# Patient Record
Sex: FEMALE | Race: WHITE | NOT HISPANIC OR LATINO | Employment: UNEMPLOYED | URBAN - METROPOLITAN AREA
[De-identification: names, ages, dates, MRNs, and addresses within clinical notes are randomized per-mention and may not be internally consistent; named-entity substitution may affect disease eponyms.]

---

## 2024-08-23 ENCOUNTER — TELEPHONE (OUTPATIENT)
Age: 39
End: 2024-08-23

## 2024-08-23 ENCOUNTER — TELEPHONE (OUTPATIENT)
Dept: GASTROENTEROLOGY | Facility: CLINIC | Age: 39
End: 2024-08-23

## 2024-08-23 DIAGNOSIS — K70.31 ALCOHOLIC CIRRHOSIS OF LIVER WITH ASCITES (HCC): Primary | ICD-10-CM

## 2024-08-23 NOTE — TELEPHONE ENCOUNTER
Patient to be discharged from [facility]. Needs office visit with PHYSICIAN ONLY in 1-2 weeks and with hepatology within 1-2 mos.    Order for outpatient paracentesis placed, please fax to [facility] at [phone] attn: Melvina Houston RN    Thanks!

## 2024-09-03 ENCOUNTER — OFFICE VISIT (OUTPATIENT)
Dept: GASTROENTEROLOGY | Facility: CLINIC | Age: 39
End: 2024-09-03
Payer: COMMERCIAL

## 2024-09-03 VITALS
DIASTOLIC BLOOD PRESSURE: 68 MMHG | BODY MASS INDEX: 31.24 KG/M2 | WEIGHT: 183 LBS | SYSTOLIC BLOOD PRESSURE: 116 MMHG | HEIGHT: 64 IN

## 2024-09-03 DIAGNOSIS — I85.10 SECONDARY ESOPHAGEAL VARICES WITHOUT BLEEDING (HCC): ICD-10-CM

## 2024-09-03 DIAGNOSIS — K70.31 ALCOHOLIC CIRRHOSIS OF LIVER WITH ASCITES (HCC): Primary | ICD-10-CM

## 2024-09-03 PROBLEM — K21.9 GERD (GASTROESOPHAGEAL REFLUX DISEASE): Status: ACTIVE | Noted: 2024-09-03

## 2024-09-03 PROCEDURE — 99213 OFFICE O/P EST LOW 20 MIN: CPT | Performed by: INTERNAL MEDICINE

## 2024-09-03 RX ORDER — PANTOPRAZOLE SODIUM 40 MG/1
40 TABLET, DELAYED RELEASE ORAL DAILY
COMMUNITY

## 2024-09-03 RX ORDER — NADOLOL 20 MG/1
20 TABLET ORAL DAILY
COMMUNITY

## 2024-09-03 RX ORDER — FUROSEMIDE 40 MG
40 TABLET ORAL DAILY
COMMUNITY

## 2024-09-03 RX ORDER — SPIRONOLACTONE 50 MG/1
100 TABLET, FILM COATED ORAL DAILY
COMMUNITY

## 2024-09-03 RX ORDER — BUPROPION HYDROCHLORIDE 150 MG/1
150 TABLET ORAL DAILY
COMMUNITY

## 2024-09-03 NOTE — PATIENT INSTRUCTIONS
Continue spironolactone, furosemide, nadolol, and pantoprazole.  Continue fluid restriction.  Blood work.  Upper endoscopy to evaluate for varices.  Follow-up office visit 2 months

## 2024-09-03 NOTE — PROGRESS NOTES
[facility] - Outpatient Follow-up Note  Breonna Beltran 39 y.o. adult MRN: [id number]  Encounter: [id number]    ASSESSMENT AND PLAN:      1. Alcoholic cirrhosis of liver with ascites (HCC)  Hospitalized last month at [facility] with decompensation including ascites and hepatic encephalopathy.  Mental status improved with lactulose but lactulose discontinued because of ongoing diarrhea and mental status remained stable.  Patient had paracenteses done and started on diuretics.  -Continue Lasix and Aldactone  - Comprehensive metabolic panel; Future  - CBC; Future  - Protime-INR; Future  - Hepatitis B surface antibody; Future  - Hepatitis A antibody, total; Future  - Magnesium; Future    2. Secondary esophageal varices without bleeding (HCC)  Last EGD 2017 without varices.  Did have varices on CAT scan and was started on Corgard while in the hospital  -Continue Corgard for now  - EGD; Future    3.  GERD  -Continue pantoprazole      Follow up appointment: 2 months    _______________________      Chief Complaint   Patient presents with    Hospital follow up-cirrhosis       HPI:   Patient is a 39 y.o. adult with a significant PMH of alcohol likely cirrhosis presenting for follow up regarding recent hospitalization.  She was hospitalized at [facility] secondary to fluid overload, hepatic encephalopathy, and general failure to thrive.  Patient was diuresed and paracentesis.  Patient was treated with lactulose which improved her mental status but then stopped because of diarrhea and fortunately her mental status remained baseline.  She was ultimately discharged with her parents.  She reports that she is doing well.  She has been following her fluid restriction and taking her medications.  She is not using drugs or alcohol.  She denies any ascites.  She reports some intermittent lower extremity edema at the end of the day but denies any consistent edema.  She denies any GI bleeding.  She  "denies any upper GI symptoms.    Historical Information   Past Medical History:   Diagnosis Date    Cirrhosis (HCC)     Esophageal varices (HCC)      Past Surgical History:   Procedure Laterality Date    CHOLECYSTECTOMY      COLONOSCOPY      LIVER BIOPSY      UPPER GASTROINTESTINAL ENDOSCOPY       Social History     Substance and Sexual Activity   Alcohol Use Not Currently     Social History     Substance and Sexual Activity   Drug Use Not Currently     Social History     Tobacco Use   Smoking Status Former    Types: Cigarettes   Smokeless Tobacco Never     Family History   Problem Relation Age of Onset    Colon polyps Father     Colon cancer Neg Hx          Current Outpatient Medications:     buPROPion (WELLBUTRIN XL) 150 mg 24 hr tablet    furosemide (LASIX) 40 mg tablet    nadolol (CORGARD) 20 mg tablet    pantoprazole (PROTONIX) 40 mg tablet    spironolactone (ALDACTONE) 50 mg tablet  No Known Allergies  Reviewed medications and allergies and updated as indicated    PHYSICAL EXAM:    Blood pressure 116/68, height 5' 3.5\" (1.613 m), weight 83 kg (183 lb). Body mass index is 31.91 kg/m².  General Appearance: NAD, cooperative, alert  Eyes: Anicteric  Chest: Clear to auscultation  Heart: Regular rate and rhythm  GI:  Soft, non-tender, non-distended; normal bowel sounds; no masses, no hepatomegaly.  Mild splenomegaly  Rectal: Deferred  Musculoskeletal: No edema.  Skin:  No jaundice  Neuro: Alert and oriented x 3.  No asterixis    Lab Results:   None since discharge    Radiology Results:   No results found.      "

## 2024-09-04 ENCOUNTER — TELEPHONE (OUTPATIENT)
Dept: GASTROENTEROLOGY | Facility: CLINIC | Age: 39
End: 2024-09-04

## 2024-09-04 NOTE — TELEPHONE ENCOUNTER
Scheduled date of EGD(as of today):10/1/24  Physician performing EGD:WADE  Location of EGD:[facility]  Instructions reviewed with patient by:JAZMIN  Clearances: N

## 2024-09-13 ENCOUNTER — TELEPHONE (OUTPATIENT)
Age: 39
End: 2024-09-13

## 2024-09-14 LAB
ALBUMIN SERPL-MCNC: 2.2 G/DL (ref 3.6–5.1)
ALBUMIN/GLOB SERPL: 0.5 (CALC) (ref 1–2.5)
ALP SERPL-CCNC: 169 U/L (ref 31–125)
ALT SERPL-CCNC: 56 U/L (ref 6–29)
AST SERPL-CCNC: 86 U/L (ref 10–30)
BASOPHILS # BLD AUTO: 57 CELLS/UL (ref 0–200)
BASOPHILS NFR BLD AUTO: 0.9 %
BILIRUB SERPL-MCNC: 6.3 MG/DL (ref 0.2–1.2)
BUN SERPL-MCNC: 6 MG/DL (ref 7–25)
BUN/CREAT SERPL: 10 (CALC) (ref 6–22)
CALCIUM SERPL-MCNC: 8.1 MG/DL (ref 8.6–10.2)
CHLORIDE SERPL-SCNC: 104 MMOL/L (ref 98–110)
CO2 SERPL-SCNC: 28 MMOL/L (ref 20–32)
CREAT SERPL-MCNC: 0.62 MG/DL (ref 0.5–0.97)
EOSINOPHIL # BLD AUTO: 227 CELLS/UL (ref 15–500)
EOSINOPHIL NFR BLD AUTO: 3.6 %
ERYTHROCYTE [DISTWIDTH] IN BLOOD BY AUTOMATED COUNT: 21.5 % (ref 11–15)
GFR/BSA.PRED SERPLBLD CYS-BASED-ARV: 117 ML/MIN/1.73M2
GLOBULIN SER CALC-MCNC: 4.2 G/DL (CALC) (ref 1.9–3.7)
GLUCOSE SERPL-MCNC: 95 MG/DL (ref 65–139)
HAV AB SER QL IA: REACTIVE
HBV SURFACE AB SER QL IA: REACTIVE
HCT VFR BLD AUTO: 32.7 % (ref 35–45)
HGB BLD-MCNC: 11 G/DL (ref 11.7–15.5)
INR PPP: 1.7
LYMPHOCYTES # BLD AUTO: 2791 CELLS/UL (ref 850–3900)
LYMPHOCYTES NFR BLD AUTO: 44.3 %
MAGNESIUM SERPL-MCNC: 1.8 MG/DL (ref 1.5–2.5)
MCH RBC QN AUTO: 29.8 PG (ref 27–33)
MCHC RBC AUTO-ENTMCNC: 33.6 G/DL (ref 32–36)
MCV RBC AUTO: 88.6 FL (ref 80–100)
MONOCYTES # BLD AUTO: 668 CELLS/UL (ref 200–950)
MONOCYTES NFR BLD AUTO: 10.6 %
NEUTROPHILS # BLD AUTO: 2558 CELLS/UL (ref 1500–7800)
NEUTROPHILS NFR BLD AUTO: 40.6 %
PLATELET # BLD AUTO: 68 THOUSAND/UL (ref 140–400)
POTASSIUM SERPL-SCNC: 3.3 MMOL/L (ref 3.5–5.3)
PROT SERPL-MCNC: 6.4 G/DL (ref 6.1–8.1)
PROTHROMBIN TIME: 17 SEC (ref 9–11.5)
RBC # BLD AUTO: 3.69 MILLION/UL (ref 3.8–5.1)
SL AMB PLATELET ESTIMATION: ABNORMAL
SODIUM SERPL-SCNC: 139 MMOL/L (ref 135–146)
WBC # BLD AUTO: 6.3 THOUSAND/UL (ref 3.8–10.8)

## 2024-09-16 ENCOUNTER — TELEPHONE (OUTPATIENT)
Dept: GASTROENTEROLOGY | Facility: CLINIC | Age: 39
End: 2024-09-16

## 2024-09-16 ENCOUNTER — TELEPHONE (OUTPATIENT)
Age: 39
End: 2024-09-16

## 2024-09-16 NOTE — TELEPHONE ENCOUNTER
Pt. Calling back in for update on results, advised provider has 72 hour kathryn period to respond to non-emergent messages, once results are reviewed someone will call to go over results, pt. Verbalized understanding

## 2024-09-17 ENCOUNTER — ANESTHESIA EVENT (OUTPATIENT)
Dept: ANESTHESIOLOGY | Facility: AMBULATORY SURGERY CENTER | Age: 39
End: 2024-09-17

## 2024-09-17 ENCOUNTER — ANESTHESIA (OUTPATIENT)
Dept: ANESTHESIOLOGY | Facility: AMBULATORY SURGERY CENTER | Age: 39
End: 2024-09-17

## 2024-09-18 DIAGNOSIS — K70.31 ALCOHOLIC CIRRHOSIS OF LIVER WITH ASCITES (HCC): Primary | ICD-10-CM

## 2024-09-18 RX ORDER — PANTOPRAZOLE SODIUM 40 MG/1
40 TABLET, DELAYED RELEASE ORAL DAILY
Qty: 90 TABLET | Refills: 0 | Status: SHIPPED | OUTPATIENT
Start: 2024-09-18

## 2024-09-18 NOTE — TELEPHONE ENCOUNTER
Discussed blood work with patient.  MELD score down to 19.  Was 28 on discharge from [facility].  Has antibodies against hepatitis A and hepatitis B so does not need vaccinations.  Continue current plan.  Follow-up with me and hepatology in the office next month

## 2024-09-18 NOTE — TELEPHONE ENCOUNTER
Patient would also like to get refills on her Aldactone 25 mg and Furosemide 20 mg.    Reason for call:   [x] Refill   [] Prior Auth  [] Other:     Office:   [] PCP/Provider -   [x] Specialty/Provider - Gastro    Medication: pantoprazole (PROTONIX) 40 mg tablet     Dose/Frequency: Take 40 mg by mouth daily     Quantity: 90    Pharmacy: [facility]/pharmacy #[id number] [address]    Does the patient have enough for 3 days?   [x] Yes   [] No - Send as HP to POD

## 2024-09-20 ENCOUNTER — TELEPHONE (OUTPATIENT)
Age: 39
End: 2024-09-20

## 2024-09-20 RX ORDER — FUROSEMIDE 40 MG
40 TABLET ORAL DAILY
Qty: 90 TABLET | Refills: 1 | Status: SHIPPED | OUTPATIENT
Start: 2024-09-20

## 2024-09-20 RX ORDER — SPIRONOLACTONE 50 MG/1
100 TABLET, FILM COATED ORAL DAILY
Qty: 90 TABLET | Refills: 1 | Status: SHIPPED | OUTPATIENT
Start: 2024-09-20

## 2024-09-20 NOTE — TELEPHONE ENCOUNTER
Spoke with pt's mother, Tabitha, she confirmed 10/28/24 at 1;30 PM appnt with EDUARDO Umanzor @ [facility].

## 2024-09-20 NOTE — TELEPHONE ENCOUNTER
Patient called to check the status request for Furosemide and Aldactone. Advised still pending.  Please send the prescriptions to [id number] - [place], [place] - [address] if approved. Patient is out of medication

## 2024-09-23 ENCOUNTER — TELEPHONE (OUTPATIENT)
Age: 39
End: 2024-09-23

## 2024-09-23 NOTE — TELEPHONE ENCOUNTER
Patients GI provider:  Dr. Concepcion    Number to return call: [phone]    Reason for call: Per anesthesia pt needed to be rescheduled to a hosp setting due to recent decompensation liver failure, esophageal varices. Soonest with Dr. Concepcion is 12/9/24 at [facility]. Pt would like to know if she can be squeezed in somehow sooner or if there is a sooner opening at [facility] with Dr. Concepcion. Please advise.     Scheduled procedure/appointment date if applicable: Apt/procedure 10/28/24,11/22/24 & 12/9/24

## 2024-10-16 ENCOUNTER — NURSE TRIAGE (OUTPATIENT)
Age: 39
End: 2024-10-16

## 2024-10-16 NOTE — TELEPHONE ENCOUNTER
I contacted patient, offered 9 am appointment with Dr. Conway tomorrow morning since available but she has therapy from 10 am to 1:15 pm and could not take that. Would you be willing to add her on to your schedule tomorrow afternoon at [place]?  Dr. Conway does have openings in [place] 10/22/24 otherwise only urgent visits available next week.     Patient is currently scheduled to see RACHEL Newberry 10/28/24 new patient hepatology.     I did review to report to ED as you noted.    Please review/advise.

## 2024-10-16 NOTE — TELEPHONE ENCOUNTER
Please advise     Pt calling in, reports yesterday started with 6/10 RUQ pain and abdomen is a little distended- she is unsure of weight gain.   Last paracentesis 8/21/24   Temp last night 99.6     Pt taking lasix and spirolactone     Additional Information   Affirmative: The patient has moderate abdominal pain that is new or worsening    Answer Assessment - Initial Assessment Questions  1. What is your diagnosis?  Cirrhosis   2. What current medications are you taking (diuretics, beta blockers, lactulose, Xifaxan, Ozempic, Ocaliva, etc..)? Are you taking your medications as prescribed or missed any doses?  Lasix- 40 mg and spirolactone    3. Have you had any recent changes in your medications or dosage? Are you taking any new medications?    Wellbutrin did increase to 300   4. What symptoms are you experiencing? (ex: Abdominal pain, jaundice (skin/eyes), abdominal distention)  Abdominal pain- RUQ   Some Abdominal distension    Vomited x1 yesterday   No jaundice     5. Do you have any lower leg swelling, shortness of breath, or weight gain?  No   6. Are you having any pain? What is your pain on a scale of 1-10?   Abdominal pain- RUQ- 6/10   7. Where is the pain located? Does the pain radiate anywhere? Does anything make the pain better or worse? Is it constant, or does it come and go (intermittent)?  RUQ   8. Have you had any recent blood work (CBC, CMP, PT/INR) or any recent testing, imaging, or procedures? If applicable, when was your last paracentesis?    9. Have you or are you currently drinking any alcohol? If so, how much have you been drinking?    10. Are you experiencing any confusion?  No   11. How many bowel movements have you been having per day? (goal is 3-4 soft bowel movements per day)  diarrhea- more than 5 x a day   12. Have you noticed any blood in your stools (red or black) or experienced any vomiting with blood in it? (If yes, please describe, including amount, frequency, etc..) Is the blood mixed  with stool or separate? Are you taking any NSAIDs or medications that can change your stool color (iron, Pepto-bismol)?  No   *Note to triager, review any recent imaging and labs. If patient has a history of portal hypertension or portal hypertensive gastropathy, check EGD report.    Protocols used: GI-Hepatology-ADULT-OH

## 2024-10-17 ENCOUNTER — OFFICE VISIT (OUTPATIENT)
Dept: GASTROENTEROLOGY | Facility: CLINIC | Age: 39
End: 2024-10-17
Payer: COMMERCIAL

## 2024-10-17 VITALS
HEIGHT: 64 IN | BODY MASS INDEX: 30.39 KG/M2 | DIASTOLIC BLOOD PRESSURE: 55 MMHG | HEART RATE: 81 BPM | WEIGHT: 178 LBS | TEMPERATURE: 98.1 F | SYSTOLIC BLOOD PRESSURE: 102 MMHG

## 2024-10-17 DIAGNOSIS — R10.10 PAIN OF UPPER ABDOMEN: Primary | ICD-10-CM

## 2024-10-17 DIAGNOSIS — K21.9 GASTROESOPHAGEAL REFLUX DISEASE WITHOUT ESOPHAGITIS: ICD-10-CM

## 2024-10-17 DIAGNOSIS — K70.31 ALCOHOLIC CIRRHOSIS OF LIVER WITH ASCITES (HCC): ICD-10-CM

## 2024-10-17 PROCEDURE — 99214 OFFICE O/P EST MOD 30 MIN: CPT | Performed by: INTERNAL MEDICINE

## 2024-10-17 RX ORDER — ONDANSETRON 4 MG/1
4 TABLET, FILM COATED ORAL EVERY 8 HOURS PRN
Qty: 20 TABLET | Refills: 2 | Status: SHIPPED | OUTPATIENT
Start: 2024-10-17

## 2024-10-17 RX ORDER — PANTOPRAZOLE SODIUM 40 MG/1
40 TABLET, DELAYED RELEASE ORAL 2 TIMES DAILY
Qty: 90 TABLET | Refills: 5 | Status: SHIPPED | OUTPATIENT
Start: 2024-10-17

## 2024-10-17 RX ORDER — POTASSIUM CHLORIDE 750 MG/1
10 TABLET, EXTENDED RELEASE ORAL DAILY
COMMUNITY
Start: 2024-09-24

## 2024-10-17 NOTE — PROGRESS NOTES
[facility] - Outpatient Follow-up Note  Breonna Beltran 39 y.o. adult MRN: [id number]  Encounter: [id number]    ASSESSMENT AND PLAN:      1. Pain of upper abdomen  Several weeks of increasing upper GI symptoms including right upper quadrant abdominal pain associated with nausea and vomiting.  Some abdominal distention but does not have significant ascites on exam.  She has an EGD scheduled at [place] but not until December  -Increase Protonix to 40 mg twice a day  - ondansetron (ZOFRAN) 4 mg tablet; Take 1 tablet (4 mg total) by mouth every 8 (eight) hours as needed for nausea or vomiting  Dispense: 20 tablet; Refill: 2  - US abdomen complete; Future  - EGD; Future (Will move it up but switch it to SLUB)    2. Alcoholic cirrhosis of liver with ascites (HCC)  Hospitalized August 2024 at [place] with decompensation including ascites and hepatic encephalopathy. Mental status improved with lactulose but lactulose discontinued because of ongoing diarrhea and mental status remained stable.  Had paracentesis in the hospital but maintained now on diuretics.  He is scheduled for EGD for variceal surveillance in December but will have to move up secondary to ongoing upper GI symptoms.  Abstinent from alcohol.  Continues to live with parents  -Continue Corgard, Lasix, and Aldactone  - EGD; Future    3. Gastroesophageal reflux disease without esophagitis    - EGD; Future  - pantoprazole (PROTONIX) 40 mg tablet; Take 1 tablet (40 mg total) by mouth 2 (two) times a day  Dispense: 90 tablet; Refill: 5      Follow up appointment: After EGD    _______________________      Chief Complaint   Patient presents with    Abdominal Pain    Diarrhea    Nausea           Vomiting       HPI:   Patient is a 39 y.o. adult with a significant PMH of decompensated cirrhosis secondary to alcohol with a history of hepatic encephalopathy and ascites who was seen emergently egarding nausea/vomiting, upper  "abdominal pain, and fevers.  She reports that she had been doing well on pantoprazole, Corgard, Aldactone, and Lasix.  Over the last few weeks though she is having increasing upper GI symptoms.  She reports nausea and vomiting and anorexia.  She reports vomiting bile.  She reports intermittent abdominal distention.  She denies any hematemesis or coffee-ground emesis.  She denies any dysphagia, odynophagia, or early satiety.  She reports some low-grade fevers.  She continues to have diarrhea but denies any melena or hematochezia.  Her weight is relatively stable.      Historical Information   Past Medical History:   Diagnosis Date    Cirrhosis (HCC)     Esophageal varices (HCC)      Past Surgical History:   Procedure Laterality Date    CHOLECYSTECTOMY      COLONOSCOPY      LIVER BIOPSY      UPPER GASTROINTESTINAL ENDOSCOPY       Social History     Substance and Sexual Activity   Alcohol Use Not Currently     Social History     Substance and Sexual Activity   Drug Use Not Currently     Social History     Tobacco Use   Smoking Status Former    Types: Cigarettes   Smokeless Tobacco Never     Family History   Problem Relation Age of Onset    Colon polyps Father     Colon cancer Neg Hx          Current Outpatient Medications:     buPROPion (WELLBUTRIN XL) 150 mg 24 hr tablet    furosemide (LASIX) 40 mg tablet    nadolol (CORGARD) 20 mg tablet    ondansetron (ZOFRAN) 4 mg tablet    pantoprazole (PROTONIX) 40 mg tablet    potassium chloride (Klor-Con) 10 mEq tablet    spironolactone (ALDACTONE) 50 mg tablet  No Known Allergies  Reviewed medications and allergies and updated as indicated    PHYSICAL EXAM:    Blood pressure 102/55, pulse 81, temperature 98.1 °F (36.7 °C), temperature source Oral, height 5' 3.5\" (1.613 m), weight 80.7 kg (178 lb). Body mass index is 31.04 kg/m².  General Appearance: NAD, cooperative, alert  Eyes: Anicteric  GI:  Soft, mild epigastric and right upper quadrant tenderness mildly distended.  Maybe " small amount of ascites.  Normal bowel sounds; no masses, no organomegaly   Rectal: Deferred  Musculoskeletal: No edema.  Skin:  No jaundice    Lab Results:   Lab Results   Component Value Date    WBC 6.3 09/13/2024    HGB 11.0 (L) 09/13/2024    MCV 88.6 09/13/2024    PLT 68 (L) 09/13/2024     Lab Results   Component Value Date    K 3.3 (L) 09/13/2024     09/13/2024    CO2 28 09/13/2024    BUN 6 (L) 09/13/2024    CREATININE 0.62 09/13/2024    CALCIUM 8.1 (L) 09/13/2024    AST 86 (H) 09/13/2024    ALT 56 (H) 09/13/2024    ALKPHOS 169 (H) 09/13/2024    EGFR 117 09/13/2024       Radiology Results:   No results found.

## 2024-10-17 NOTE — H&P (VIEW-ONLY)
[facility] - Outpatient Follow-up Note  Breonna Beltran 39 y.o. adult MRN: [id number]  Encounter: [id number]    ASSESSMENT AND PLAN:      1. Pain of upper abdomen  Several weeks of increasing upper GI symptoms including right upper quadrant abdominal pain associated with nausea and vomiting.  Some abdominal distention but does not have significant ascites on exam.  She has an EGD scheduled at [place] but not until December  -Increase Protonix to 40 mg twice a day  - ondansetron (ZOFRAN) 4 mg tablet; Take 1 tablet (4 mg total) by mouth every 8 (eight) hours as needed for nausea or vomiting  Dispense: 20 tablet; Refill: 2  - US abdomen complete; Future  - EGD; Future (Will move it up but switch it to SLUB)    2. Alcoholic cirrhosis of liver with ascites (HCC)  Hospitalized August 2024 at [place] with decompensation including ascites and hepatic encephalopathy. Mental status improved with lactulose but lactulose discontinued because of ongoing diarrhea and mental status remained stable.  Had paracentesis in the hospital but maintained now on diuretics.  He is scheduled for EGD for variceal surveillance in December but will have to move up secondary to ongoing upper GI symptoms.  Abstinent from alcohol.  Continues to live with parents  -Continue Corgard, Lasix, and Aldactone  - EGD; Future    3. Gastroesophageal reflux disease without esophagitis    - EGD; Future  - pantoprazole (PROTONIX) 40 mg tablet; Take 1 tablet (40 mg total) by mouth 2 (two) times a day  Dispense: 90 tablet; Refill: 5      Follow up appointment: After EGD    _______________________      Chief Complaint   Patient presents with    Abdominal Pain    Diarrhea    Nausea           Vomiting       HPI:   Patient is a 39 y.o. adult with a significant PMH of decompensated cirrhosis secondary to alcohol with a history of hepatic encephalopathy and ascites who was seen emergently egarding nausea/vomiting, upper  "abdominal pain, and fevers.  She reports that she had been doing well on pantoprazole, Corgard, Aldactone, and Lasix.  Over the last few weeks though she is having increasing upper GI symptoms.  She reports nausea and vomiting and anorexia.  She reports vomiting bile.  She reports intermittent abdominal distention.  She denies any hematemesis or coffee-ground emesis.  She denies any dysphagia, odynophagia, or early satiety.  She reports some low-grade fevers.  She continues to have diarrhea but denies any melena or hematochezia.  Her weight is relatively stable.      Historical Information   Past Medical History:   Diagnosis Date    Cirrhosis (HCC)     Esophageal varices (HCC)      Past Surgical History:   Procedure Laterality Date    CHOLECYSTECTOMY      COLONOSCOPY      LIVER BIOPSY      UPPER GASTROINTESTINAL ENDOSCOPY       Social History     Substance and Sexual Activity   Alcohol Use Not Currently     Social History     Substance and Sexual Activity   Drug Use Not Currently     Social History     Tobacco Use   Smoking Status Former    Types: Cigarettes   Smokeless Tobacco Never     Family History   Problem Relation Age of Onset    Colon polyps Father     Colon cancer Neg Hx          Current Outpatient Medications:     buPROPion (WELLBUTRIN XL) 150 mg 24 hr tablet    furosemide (LASIX) 40 mg tablet    nadolol (CORGARD) 20 mg tablet    ondansetron (ZOFRAN) 4 mg tablet    pantoprazole (PROTONIX) 40 mg tablet    potassium chloride (Klor-Con) 10 mEq tablet    spironolactone (ALDACTONE) 50 mg tablet  No Known Allergies  Reviewed medications and allergies and updated as indicated    PHYSICAL EXAM:    Blood pressure 102/55, pulse 81, temperature 98.1 °F (36.7 °C), temperature source Oral, height 5' 3.5\" (1.613 m), weight 80.7 kg (178 lb). Body mass index is 31.04 kg/m².  General Appearance: NAD, cooperative, alert  Eyes: Anicteric  GI:  Soft, mild epigastric and right upper quadrant tenderness mildly distended.  Maybe " small amount of ascites.  Normal bowel sounds; no masses, no organomegaly   Rectal: Deferred  Musculoskeletal: No edema.  Skin:  No jaundice    Lab Results:   Lab Results   Component Value Date    WBC 6.3 09/13/2024    HGB 11.0 (L) 09/13/2024    MCV 88.6 09/13/2024    PLT 68 (L) 09/13/2024     Lab Results   Component Value Date    K 3.3 (L) 09/13/2024     09/13/2024    CO2 28 09/13/2024    BUN 6 (L) 09/13/2024    CREATININE 0.62 09/13/2024    CALCIUM 8.1 (L) 09/13/2024    AST 86 (H) 09/13/2024    ALT 56 (H) 09/13/2024    ALKPHOS 169 (H) 09/13/2024    EGFR 117 09/13/2024       Radiology Results:   No results found.

## 2024-10-17 NOTE — PATIENT INSTRUCTIONS
Increase pantoprazole to 40 mg twice a day.  Zofran as needed for nausea.  Abdominal ultrasound.  Will move EGD.

## 2024-10-23 RX ORDER — SODIUM CHLORIDE 9 MG/ML
125 INJECTION, SOLUTION INTRAVENOUS CONTINUOUS
Status: CANCELLED | OUTPATIENT
Start: 2024-10-23

## 2024-10-24 ENCOUNTER — ANESTHESIA EVENT (OUTPATIENT)
Dept: GASTROENTEROLOGY | Facility: HOSPITAL | Age: 39
End: 2024-10-24
Payer: COMMERCIAL

## 2024-10-24 ENCOUNTER — ANESTHESIA (OUTPATIENT)
Dept: GASTROENTEROLOGY | Facility: HOSPITAL | Age: 39
End: 2024-10-24
Payer: COMMERCIAL

## 2024-10-24 ENCOUNTER — HOSPITAL ENCOUNTER (OUTPATIENT)
Dept: GASTROENTEROLOGY | Facility: HOSPITAL | Age: 39
Setting detail: OUTPATIENT SURGERY
End: 2024-10-24
Attending: INTERNAL MEDICINE
Payer: COMMERCIAL

## 2024-10-24 VITALS
HEART RATE: 79 BPM | SYSTOLIC BLOOD PRESSURE: 94 MMHG | TEMPERATURE: 98.1 F | OXYGEN SATURATION: 99 % | DIASTOLIC BLOOD PRESSURE: 56 MMHG | RESPIRATION RATE: 18 BRPM

## 2024-10-24 DIAGNOSIS — I85.10 SECONDARY ESOPHAGEAL VARICES WITHOUT BLEEDING (HCC): ICD-10-CM

## 2024-10-24 PROCEDURE — 88305 TISSUE EXAM BY PATHOLOGIST: CPT | Performed by: STUDENT IN AN ORGANIZED HEALTH CARE EDUCATION/TRAINING PROGRAM

## 2024-10-24 PROCEDURE — 88342 IMHCHEM/IMCYTCHM 1ST ANTB: CPT | Performed by: STUDENT IN AN ORGANIZED HEALTH CARE EDUCATION/TRAINING PROGRAM

## 2024-10-24 PROCEDURE — 43239 EGD BIOPSY SINGLE/MULTIPLE: CPT | Performed by: INTERNAL MEDICINE

## 2024-10-24 RX ORDER — SODIUM CHLORIDE, SODIUM LACTATE, POTASSIUM CHLORIDE, CALCIUM CHLORIDE 600; 310; 30; 20 MG/100ML; MG/100ML; MG/100ML; MG/100ML
INJECTION, SOLUTION INTRAVENOUS CONTINUOUS PRN
Status: DISCONTINUED | OUTPATIENT
Start: 2024-10-24 | End: 2024-10-24

## 2024-10-24 RX ORDER — SODIUM CHLORIDE 9 MG/ML
125 INJECTION, SOLUTION INTRAVENOUS CONTINUOUS
Status: DISPENSED | OUTPATIENT
Start: 2024-10-24

## 2024-10-24 RX ORDER — LIDOCAINE HYDROCHLORIDE 20 MG/ML
INJECTION, SOLUTION EPIDURAL; INFILTRATION; INTRACAUDAL; PERINEURAL AS NEEDED
Status: DISCONTINUED | OUTPATIENT
Start: 2024-10-24 | End: 2024-10-24

## 2024-10-24 RX ORDER — PROPOFOL 10 MG/ML
INJECTION, EMULSION INTRAVENOUS AS NEEDED
Status: DISCONTINUED | OUTPATIENT
Start: 2024-10-24 | End: 2024-10-24

## 2024-10-24 RX ADMIN — LIDOCAINE HYDROCHLORIDE 100 MG: 20 INJECTION, SOLUTION EPIDURAL; INFILTRATION; INTRACAUDAL; PERINEURAL at 13:46

## 2024-10-24 RX ADMIN — SODIUM CHLORIDE, SODIUM LACTATE, POTASSIUM CHLORIDE, AND CALCIUM CHLORIDE: .6; .31; .03; .02 INJECTION, SOLUTION INTRAVENOUS at 13:35

## 2024-10-24 RX ADMIN — PROPOFOL 200 MG: 10 INJECTION, EMULSION INTRAVENOUS at 13:46

## 2024-10-24 NOTE — ANESTHESIA PREPROCEDURE EVALUATION
Procedure:  EGD    Relevant Problems   CARDIO   (+) Esophageal varices (HCC)      GI/HEPATIC   (+) Alcoholic cirrhosis of liver with ascites (HCC)   (+) GERD (gastroesophageal reflux disease)        Physical Exam    Airway    Mallampati score: II  TM Distance: >3 FB  Neck ROM: full     Dental        Cardiovascular  Cardiovascular exam normal    Pulmonary  Pulmonary exam normal     Other Findings  post-pubertal.      Anesthesia Plan  ASA Score- 3     Anesthesia Type- IV sedation with anesthesia with ASA Monitors.         Additional Monitors:     Airway Plan:            Plan Factors-Exercise tolerance (METS): >4 METS.    Chart reviewed.   Existing labs reviewed.     Patient is not a current smoker. Patient not instructed to abstain from smoking on day of procedure. Patient did not smoke on day of surgery.            Induction- intravenous.    Postoperative Plan-         Informed Consent- Anesthetic plan and risks discussed with patient.  I personally reviewed this patient with the CRNA. Discussed and agreed on the Anesthesia Plan with the CRNA..

## 2024-10-24 NOTE — ANESTHESIA POSTPROCEDURE EVALUATION
Post-Op Assessment Note    CV Status:  Stable  Pain Score: 0    Pain management: adequate       Mental Status:  Arousable and sleepy   Hydration Status:  Stable   PONV Controlled:  Controlled   Airway Patency:  Patent     Post Op Vitals Reviewed: Yes    No anethesia notable event occurred.    Staff: CRNA           Last Filed PACU Vitals:  Vitals Value Taken Time   Temp     Pulse 75    /53    Resp 14    SpO2 99        Modified Danilo:  Activity: 2 (10/24/2024  1:11 PM)  Respiration: 2 (10/24/2024  1:11 PM)  Circulation: 2 (10/24/2024  1:11 PM)  Consciousness: 2 (10/24/2024  1:11 PM)  Oxygen Saturation: 2 (10/24/2024  1:11 PM)  Modified Danilo Score: 10 (10/24/2024  1:11 PM)

## 2024-10-28 ENCOUNTER — OFFICE VISIT (OUTPATIENT)
Dept: GASTROENTEROLOGY | Facility: CLINIC | Age: 39
End: 2024-10-28
Payer: COMMERCIAL

## 2024-10-28 VITALS
SYSTOLIC BLOOD PRESSURE: 109 MMHG | HEIGHT: 64 IN | DIASTOLIC BLOOD PRESSURE: 55 MMHG | WEIGHT: 184.2 LBS | BODY MASS INDEX: 31.45 KG/M2

## 2024-10-28 DIAGNOSIS — K76.82 HEPATIC ENCEPHALOPATHY (HCC): ICD-10-CM

## 2024-10-28 DIAGNOSIS — R79.89 ABNORMAL LIVER FUNCTION TESTS: ICD-10-CM

## 2024-10-28 DIAGNOSIS — D69.6 THROMBOCYTOPENIA (HCC): ICD-10-CM

## 2024-10-28 DIAGNOSIS — F19.10 POLYSUBSTANCE ABUSE (HCC): ICD-10-CM

## 2024-10-28 DIAGNOSIS — K70.31 ALCOHOLIC CIRRHOSIS OF LIVER WITH ASCITES (HCC): Primary | ICD-10-CM

## 2024-10-28 PROCEDURE — 99214 OFFICE O/P EST MOD 30 MIN: CPT | Performed by: FAMILY MEDICINE

## 2024-10-28 NOTE — PROGRESS NOTES
[facility] - Outpatient Consultation  Breonna Beltran 39 y.o. female MRN: [id number]  Encounter: [id number]          ASSESSMENT AND PLAN:      1. Alcoholic cirrhosis of liver with ascites (HCC)  2. Hepatic encephalopathy (HCC)  3. Thrombocytopenia (HCC)  4. Abnormal liver function tests  5. Polysubstance abuse (HCC)    Summary: Patient is a 39 y.o. female with cirrhosis secondary to EtOH d/b ascites and hepatic encephalopathy. Current MELD-3.0 (23).    Patient with biopsy-proven cirrhosis likely secondary to EtOH given her history of chronic and excessive alcohol use which has been decompensated by ascites and hepatic encephalopathy. She reports sustained sobriety since 2016 but was recently hospitalized for AMS secondary to EGD in the setting of [place] drug use. Her mentation improved with lactulose but this was not continued due to excessive diarrhea.  She also required a paracentesis and is currently taking Lasix 40 mg and Aldactone 100 mg once daily.    Fortunately, she has been drug-free 41 days and is involved in IOP and group therapy. Congratulated her efforts! She does report a 6 lb weight gain and abdominal distention which is likely due to ascites. She is scheduled for complete abdominal ultrasound on 10/29, which can confirm the presence of ascites, and is having labs drawn this week. If stable renal function would recommend increasing her diuretics to Lasix 60/Aldactone 150 mg once daily.    She is also reporting reversal of her sleep-wake cycles. No obvious asterixis on exam today. Given that she cannot tolerate lactulose we will start rifaximin 550 mg twice daily.     She is up-to-date with an EGD for variceal screening. Her upcoming ultrasound will service for hepatoma screening. Suspect that her elevated liver chemistries are due to chronic liver disease but also ordered a complete serologic evaluation to assess for competing causes. Additional management as  below.    Ascites   - Reports 6 lb weight gain and abdominal distention.   - Continue Lasix 40 mg once daily  - Continue Aldactone 100 mg once daily.   - Having labs drawn this week; If with stable Cr and lytes would recommend increasing diuretics to Lasix 60 mg and Aldactone 150 mg once daily.   - Encouraged to adhere to a low-sodium (<2000 mg daily) diet.     Esophageal Varices   - EGD (10/24) without gastric or esophageal varices.   - Currently taking nadolol for tachycardia.   - HR not to goal for the purpose of primary ppx  - Defer increasing NSBB due to low-normal BP and no varices on EGD.  - Prefers to continue nadolol due to benefits for tachycardia and anxiety but plans to discuss switching to carvedilol with her cardiologist in follow-up which does not have to be titrated to HR.     Hepatic Encephalopathy   - +Reversal of sleep wake cycles  - Cannot tolerate lactulose due to chronic diarrhea  - Start rifaximin 550 mg twice daily.   - Patient aware of signs/sxs's of HE and advised to promptly inform provider if experiencing such.    HCC Screening   - Scheduled for complete abdominal ultrasound.   - AFP level to be drawn with her next set of routine labs.   - Continue imaging q6 months and AFP annually.     Nutritional Status  - No sarcopenia noted on exam today.   - Encouraged high-protein diet in addition to a high-protein snack qHS to prevent prolonged fasting.     Vaccines   - +Immunity to hep A and B.     Transplant Candidacy   - Defer given recent [place] drug use.     - CBC and differential; Future  - Comprehensive metabolic panel; Future  - Protime-INR; Future  - AFP tumor marker; Future  - Alpha 1 Antitrypsin Phenotype; Future  - RAYNE Screen w/ Reflex to Titer/Pattern; Future  - Antimitochondrial antibody; Future  - Anti-smooth muscle antibody, IgG; Future  - IgG, IgA, IgM; Future  - Chronic Hepatitis Panel; Future  - Iron Panel (Includes Ferritin, Iron Sat%, Iron, and TIBC); Future  - Ceruloplasmin;  Future  - rifaximin (XIFAXAN) 550 mg tablet; Take 1 tablet (550 mg total) by mouth every 12 (twelve) hours  Dispense: 180 tablet; Refill: 3    Follow-up with Dr. Concepcion as scheduled.   Follow-up in 6 months or sooner if necessary.   ______________________________________________________________________    HPI: Patient is a 39 y.o. female with PMH significant for GERD who presents today for a consultation regarding cirrhosis secondary to EtOH.     Ayden reportedly had a liver biopsy in 2017 demonstrating cirrhosis. Pathology is unavailable for review. Her liver disease suspected to be secondary to EtOH given her history of chronic and excessive alcohol use. She reports maintained sobriety since 2016. Her liver disease has been decompensated by ascites and hepatic encephalopathy. She had an EGD just a few days prior (10/24) notable for mild duodenitis and mild antral gastritis with possible PHG. No evidence of gastric or esophageal varices. Her biopsies are still in process that the time of today's appointment.    She was at [facility] hospitalized in August for AMS and the inability to care for herself. She was suspected to have hepatic encephalopathy due to an elevated ammonia level and +asterixis on exam. This was in the setting of [place] drug use. Her urine tox was positive for opiates, fentanyl and cannabis.  Denies EtOH use and had a negative EtOH on admission. She was noted to have significant thrombocytopenia as well as moderately abnormal liver chemistries. Imaging is not available for review but she reportedly had a CT scan demonstrating findings c/w cirrhosis and portal hypertension as well as an MRI/MRCP negative for hepatobiliary obstruction. She required a paracentesis with removal of 4.6L and was negative for SBP. Her mentation improved with lactulose but this was stopped due to diarrhea.    Today, she reports that she is 81 days drug-free and involved in an IOP through [facility]. She also participates in  group therapy. She has been feeling well but does experience some cramping and reversal of sleep and wake cycles.    MELD 3.0: 23 at 9/13/2024  2:13 PM  MELD-Na: 19 at 9/13/2024  2:13 PM  Calculated from:  Serum Creatinine: 0.62 mg/dL (Using min of 1 mg/dL) at 9/13/2024  2:13 PM  Serum Sodium: 139 mmol/L (Using max of 137 mmol/L) at 9/13/2024  2:13 PM  Total Bilirubin: 6.3 mg/dL at 9/13/2024  2:13 PM  Serum Albumin: 2.2 g/dL at 9/13/2024  2:13 PM  INR(ratio): 1.7 at 9/13/2024  2:13 PM  Age at listing (hypothetical): 39 years  Sex: Female at 9/13/2024  2:13 PM      REVIEW OF SYSTEMS:    CONSTITUTIONAL: Denies any fever, chills, rigors, and weight loss.  HEENT: No earache or tinnitus. Denies hearing loss or visual disturbances.  CARDIOVASCULAR: No chest pain or palpitations.   RESPIRATORY: Denies any cough, hemoptysis, shortness of breath or dyspnea on exertion.  GASTROINTESTINAL: As noted in the History of Present Illness.   GENITOURINARY: No problems with urination. Denies any hematuria or dysuria.  NEUROLOGIC: No dizziness or vertigo, denies headaches.   MUSCULOSKELETAL: Denies any muscle or joint pain.   SKIN: Denies skin rashes or itching.   ENDOCRINE: Denies excessive thirst. Denies intolerance to heat or cold.  PSYCHOSOCIAL: Denies depression or anxiety. Denies any recent memory loss.       Historical Information   Past Medical History:   Diagnosis Date    Cirrhosis (HCC)     Esophageal varices (HCC)      Past Surgical History:   Procedure Laterality Date    CHOLECYSTECTOMY      COLONOSCOPY      LIVER BIOPSY      UPPER GASTROINTESTINAL ENDOSCOPY       Social History   Social History     Substance and Sexual Activity   Alcohol Use Not Currently     Social History     Substance and Sexual Activity   Drug Use Not Currently     Social History     Tobacco Use   Smoking Status Former    Types: Cigarettes   Smokeless Tobacco Never     Family History   Problem Relation Age of Onset    Colon polyps Father     Colon  "cancer Neg Hx        Meds/Allergies       Current Outpatient Medications:     buPROPion (WELLBUTRIN XL) 150 mg 24 hr tablet    furosemide (LASIX) 40 mg tablet    nadolol (CORGARD) 20 mg tablet    ondansetron (ZOFRAN) 4 mg tablet    pantoprazole (PROTONIX) 40 mg tablet    potassium chloride (Klor-Con) 10 mEq tablet    rifaximin (XIFAXAN) 550 mg tablet    spironolactone (ALDACTONE) 50 mg tablet    No Known Allergies        Objective     Blood pressure 109/55, height 5' 3.5\" (1.613 m), weight 83.6 kg (184 lb 3.2 oz). Body mass index is 32.12 kg/m².        PHYSICAL EXAM:      General Appearance:   Alert, cooperative, no distress; AAOx3, no asterixis   HEENT:   Normocephalic, atraumatic, anicteric; No scleral icterus     Neck:  Supple, symmetrical, trachea midline   Lungs:   Clear to auscultation bilaterally; no rales, rhonchi or wheezing; respirations unlabored    Heart::   Regular rate and rhythm; no murmur, rub, or gallop.   Abdomen:   Soft, non-tender, non-distended; normal bowel sounds; no masses, no organomegaly    Genitalia:   Deferred    Rectal:   Deferred    Extremities:  No palmar erythema, cyanosis, clubbing or edema    Pulses:  2+ and symmetric    Skin:  No spider angiomas, jaundice, rashes, or lesions    Lymph nodes:  No palpable cervical lymphadenopathy        Lab Results:   No visits with results within 1 Day(s) from this visit.   Latest known visit with results is:   Hospital Outpatient Visit on 10/24/2024   Component Date Value    Case Report 10/24/2024                      Value:Surgical Pathology Report                         Case: [id number]                                  Authorizing Provider:  Lm Concepcion MD      Collected:           10/24/2024 5247              Ordering Location:     [facility]     Received:            10/24/2024 1454                                     [facility]                                                             Pathologist:           Kristen" "Arian Rowe MD                                                         Specimen:    Stomach, gastritis                                                                         Final Diagnosis 10/24/2024                      Value:A. Gastric mucosa, \"gastritis\"; biopsy:      - Gastric antral-type mucosa with changes consistent with reactive gastropathy      - Negative for intestinal metaplasia, dysplasia or carcinoma      - An H pylori immunohistochemical stain is negative         Additional Information 10/24/2024                      Value:Interpretation performed at [facility], [address] [facility] [address]    All reported additional testing was performed with appropriately reactive controls.  These tests were developed and their performance characteristics determined by [facility] or appropriate performing facility, though some tests may be performed on tissues which have not been validated for performance characteristics (such as staining performed on alcohol exposed cell blocks and decalcified tissues).  Results should be interpreted with caution and in the context of the patients’ clinical condition. These tests may not be cleared or approved by the U.S. Food and Drug Administration, though the FDA has determined that such clearance or approval is not necessary. These tests are used for clinical purposes and they should not be regarded as investigational or for research. This laboratory has been approved by CLIA 88, designated as a high-complexity laboratory and is qualified to perform                           these tests.  .      Gross Description 10/24/2024                      Value:A. The specimen is received in formalin, labeled with the patient's name and hospital number, and is designated \" stomach\".  Specimen consists of 2 tan soft tissue fragments measuring 0.2 and 0.3 cm in greatest dimension.  Entirely submitted. One screened cassette.    Note: The estimated total " formalin fixation time based upon information provided by the submitting clinician and the standard processing schedule is under 72 hours. [facility]             Radiology Results:   EGD    Result Date: 10/24/2024  Narrative: Table formatting from the original result was not included. [facility] [address] [facility] [place] [address] [phone] DATE OF SERVICE: 10/24/24 PHYSICIAN(S): Attending: Lm Concepcion MD Fellow: No Staff Documented INDICATION: Secondary esophageal varices without bleeding (HCC) POST-OP DIAGNOSIS: See the impression below. PREPROCEDURE: Informed consent was obtained for the procedure, including sedation.  Risks of perforation, hemorrhage, adverse drug reaction and aspiration were discussed. The patient was placed in the left lateral decubitus position. Patient was explained about the risks and benefits of the procedure. Risks including but not limited to bleeding, infection, and perforation were explained in detail. Also explained about less than 100% sensitivity with the exam and other alternatives. PROCEDURE: EGD DETAILS OF PROCEDURE: Patient was taken to the procedure room where a time out was performed to confirm correct patient and correct procedure. The patient underwent monitored anesthesia care, which was administered by an anesthesia professional. The patient's blood pressure, heart rate, level of consciousness, respirations, oxygen, ECG and ETCO2 were monitored throughout the procedure. The scope was introduced through the mouth and advanced to the second part of the duodenum. Retroflexion was performed in the fundus. The patient experienced no blood loss. The procedure was not difficult. The patient tolerated the procedure well. There were no apparent adverse events. ANESTHESIA INFORMATION: ASA: III Anesthesia Type: IV Sedation with Anesthesia MEDICATIONS: No administrations occurring from 1335 to 1352 on 10/24/24 FINDINGS: Mild  erythematous mucosa in the duodenum Erythematous mucosa that did not appear mosaic in the stomach; performed cold forceps biopsy The esophagus appeared normal. SPECIMENS: ID Type Source Tests Collected by Time Destination 1 : gastritis Tissue Stomach TISSUE EXAM Lm Concepcion MD 10/24/2024  1:48 PM      Impression: Mild duodenitis Mild antral gastritis portal hypertension and stomach.  Biopsies taken Normal esophagus.  No varices RECOMMENDATION: Resume regular diet and medications I will call with biopsy results in 1 to 2 weeks Abdominal ultrasound as scheduled. If you have increasing abdominal distention let me know we can arrange outpatient paracentesis Follow-up in the office   MD Erna Knight PA-C     **Please note: Dictation voice to text software may have been used in the creation of this record. Occasional wrong word or “sound alike” substitutions may have occurred due to the inherent limitations of voice recognition software. Read the chart carefully and recognize, using context, where substitutions have occurred.**

## 2024-10-29 ENCOUNTER — TELEPHONE (OUTPATIENT)
Age: 39
End: 2024-10-29

## 2024-10-29 ENCOUNTER — HOSPITAL ENCOUNTER (OUTPATIENT)
Dept: ULTRASOUND IMAGING | Facility: CLINIC | Age: 39
Discharge: HOME | End: 2024-10-29
Payer: COMMERCIAL

## 2024-10-29 DIAGNOSIS — R10.10 PAIN OF UPPER ABDOMEN: ICD-10-CM

## 2024-10-29 PROCEDURE — 88342 IMHCHEM/IMCYTCHM 1ST ANTB: CPT | Performed by: STUDENT IN AN ORGANIZED HEALTH CARE EDUCATION/TRAINING PROGRAM

## 2024-10-29 PROCEDURE — 76700 US EXAM ABDOM COMPLETE: CPT

## 2024-10-29 PROCEDURE — 88305 TISSUE EXAM BY PATHOLOGIST: CPT | Performed by: STUDENT IN AN ORGANIZED HEALTH CARE EDUCATION/TRAINING PROGRAM

## 2024-10-29 NOTE — TELEPHONE ENCOUNTER
PA for Xifaxan 550 mg SUBMITTED     via    []CM-KEY:   [x]Abril-Case ID # [id number]:[facility] PlanPhone:[phone]   []Availity-Auth ID # NDC #   []Faxed to plan   []Other website   []Phone call Case ID #     Office notes sent, clinical questions answered. Awaiting determination    Turnaround time for your insurance to make a decision on your Prior Authorization can take 7-21 business days.

## 2024-10-30 ENCOUNTER — TELEPHONE (OUTPATIENT)
Age: 39
End: 2024-10-30

## 2024-10-30 NOTE — TELEPHONE ENCOUNTER
LOV 10/28/24, EGD 10/24/24, F/U 03/06/25, Hepatology 05/06/25    Pt reports she missed a results call. Pathology report is completed however has not been addended by provider. Pt placed me on hold then call was disconnected. Upon final review of chart  just called pt and provided results hence the disconnected call while on hold.

## 2024-10-31 NOTE — TELEPHONE ENCOUNTER
PA for xifaxan APPROVED     Date(s) approved Authorized from October 29, 2024 to October 29, 2025        Case #[id number]     Patient advised by          []Fliptuhart Message  [x]Phone call voiced understanding  []LMOM  []L/M to call office as no active Communication consent on file  []Unable to leave detailed message as VM not approved on Communication consent       Pharmacy advised by    [x]Fax  []Phone call    Approval letter scanned into Media Yes

## 2024-10-31 NOTE — TELEPHONE ENCOUNTER
Patient called in to have Lab orders faxed to [facility] she needs PT INR, PLATELETS AND GFR order to be faxed and the order for Paracentesis needs to be faxed as well please call patient once the orders are faxed she is tripp for next week

## 2024-11-05 ENCOUNTER — TELEPHONE (OUTPATIENT)
Age: 39
End: 2024-11-05

## 2024-11-05 NOTE — TELEPHONE ENCOUNTER
Lisseth from [facility] [phone] called to get the Ultrasound order faxed at the [phone] as the pt is there to get the ultrasound done call was transferred to Jessica in [place] office for the further assistance

## 2024-11-21 ENCOUNTER — TELEPHONE (OUTPATIENT)
Dept: GASTROENTEROLOGY | Facility: CLINIC | Age: 39
End: 2024-11-21

## 2024-12-05 ENCOUNTER — TELEPHONE (OUTPATIENT)
Age: 39
End: 2024-12-05

## 2024-12-05 NOTE — TELEPHONE ENCOUNTER
Contacted patient off of Medication Management  to verify needs of services in attempts to offer patient an appointment. Spoke w. Patient's mother who stated patient passed away on 11/20. Patient has been removed from wait list.